# Patient Record
Sex: FEMALE | Race: OTHER | NOT HISPANIC OR LATINO | URBAN - METROPOLITAN AREA
[De-identification: names, ages, dates, MRNs, and addresses within clinical notes are randomized per-mention and may not be internally consistent; named-entity substitution may affect disease eponyms.]

---
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
## 2023-06-08 NOTE — ED ADULT NURSE NOTE - EXTENSIONS OF SELF_ADULT
Pt discharged to home. Pt was given follow up instructions. Pt verbalized understanding of all instructions for discharge and is ambulatory out of ED with steady gait. AOx4     None

## 2024-03-31 ENCOUNTER — EMERGENCY (EMERGENCY)
Facility: HOSPITAL | Age: 19
LOS: 1 days | Discharge: HOME | End: 2024-03-31
Attending: EMERGENCY MEDICINE | Admitting: EMERGENCY MEDICINE
Payer: COMMERCIAL

## 2024-03-31 VITALS
DIASTOLIC BLOOD PRESSURE: 78 MMHG | TEMPERATURE: 98 F | RESPIRATION RATE: 17 BRPM | SYSTOLIC BLOOD PRESSURE: 115 MMHG | HEART RATE: 88 BPM | WEIGHT: 126.99 LBS | OXYGEN SATURATION: 98 %

## 2024-03-31 PROCEDURE — 99284 EMERGENCY DEPT VISIT MOD MDM: CPT | Mod: 25

## 2024-03-31 NOTE — ED ADULT TRIAGE NOTE - CHIEF COMPLAINT QUOTE
(E4) spontaneous c/o nasal congestion, fevers, chills, and sore throat for x4 days. last took dayquil 12pm in afternoon. denies travel/sick contacts. denies medical history

## 2024-04-01 LAB
FLUAV AG NPH QL: SIGNIFICANT CHANGE UP
FLUBV AG NPH QL: SIGNIFICANT CHANGE UP
RSV RNA NPH QL NAA+NON-PROBE: SIGNIFICANT CHANGE UP
SARS-COV-2 RNA SPEC QL NAA+PROBE: SIGNIFICANT CHANGE UP

## 2024-04-01 RX ORDER — ACETAMINOPHEN 500 MG
650 TABLET ORAL ONCE
Refills: 0 | Status: COMPLETED | OUTPATIENT
Start: 2024-04-01 | End: 2024-04-01

## 2024-04-01 RX ORDER — DEXAMETHASONE 0.5 MG/5ML
6 ELIXIR ORAL ONCE
Refills: 0 | Status: COMPLETED | OUTPATIENT
Start: 2024-04-01 | End: 2024-04-01

## 2024-04-01 RX ORDER — KETOROLAC TROMETHAMINE 30 MG/ML
30 SYRINGE (ML) INJECTION ONCE
Refills: 0 | Status: DISCONTINUED | OUTPATIENT
Start: 2024-04-01 | End: 2024-04-01

## 2024-04-01 RX ADMIN — Medication 30 MILLIGRAM(S): at 01:54

## 2024-04-01 RX ADMIN — Medication 650 MILLIGRAM(S): at 01:54

## 2024-04-01 RX ADMIN — Medication 6 MILLIGRAM(S): at 01:55

## 2024-04-01 NOTE — ED PROVIDER NOTE - OBJECTIVE STATEMENT
19 yo F with no PMHx here with nasal congestion, subjective fevers, chills, and sore throat for x4 days. The pt has been taking dayquil with last dose at 12pm.  Denies sob, cp, n/v, abdominal pain, dysuria, diarrhea.

## 2024-04-01 NOTE — ED PROVIDER NOTE - NS ED MD DISPO DISCHARGE
Date: 10/12/2022    Patient Name: Pedro Melara          To Whom it may concern:      Dena received a influenza vaccine today in our office on 10/12/22.          Sincerely,    Faustina Glynn MD
Home

## 2024-04-01 NOTE — ED PROVIDER NOTE - PATIENT PORTAL LINK FT
You can access the FollowMyHealth Patient Portal offered by [facility] by registering at the following website: [email or web address]. By joining [employer]’s FollowMyHealth portal, you will also be able to view your health information using other applications (apps) compatible with our system.

## 2024-08-06 NOTE — ED ADULT NURSE NOTE - CAS DISCH TRANSFER METHOD
I called [employer] to initiate a prior authorization for Zepbound 12.5mg. They stated that all weight loss medications are excluded from the patients plan. Patient notified via [employer].    Private car

## 2025-02-13 NOTE — ED PROVIDER NOTE - NS ED MD DISPO DISCHARGE CCDA
Quality 431: Preventive Care And Screening: Unhealthy Alcohol Use - Screening: Patient not identified as an unhealthy alcohol user when screened for unhealthy alcohol use using a systematic screening method Detail Level: Detailed Quality 226: Preventive Care And Screening: Tobacco Use: Screening And Cessation Intervention: Patient screened for tobacco use and is an ex/non-smoker Patient/Caregiver provided printed discharge information.
